# Patient Record
Sex: FEMALE | Race: ASIAN | Employment: FULL TIME | URBAN - METROPOLITAN AREA
[De-identification: names, ages, dates, MRNs, and addresses within clinical notes are randomized per-mention and may not be internally consistent; named-entity substitution may affect disease eponyms.]

---

## 2019-07-23 ENCOUNTER — PATIENT OUTREACH (OUTPATIENT)
Dept: OTHER | Age: 69
End: 2019-07-23

## 2019-07-23 NOTE — PROGRESS NOTES
CCM progress note    Patient eligible for [facility] Employee care management    PMH:   Past Medical History:   Diagnosis Date    Cancer [facility])     breast    H/O partial mastectomy 06/2019    Hypertension     Renal cell cancer, left (HCC) 07/22/2019       Current Outpatient Medications   Medication Sig    triamterene-hydroCHLOROthiazide (MAXZIDE) 37.5-25 mg per tablet TAKE ONE TAB BY MOUTH DAILY    potassium chloride SA (MICRO-K) 10 mEq capsule Take 10 mEq by mouth daily. No current facility-administered medications for this visit.         Social History:   Social History     Socioeconomic History    Marital status: UNKNOWN     Spouse name: Not on file    Number of children: Not on file    Years of education: Not on file    Highest education level: Not on file   Occupational History    Not on file   Social Needs    Financial resource strain: Not on file    Food insecurity:     Worry: Not on file     Inability: Not on file    Transportation needs:     Medical: Not on file     Non-medical: Not on file   Tobacco Use    Smoking status: Never Smoker    Smokeless tobacco: Never Used   Substance and Sexual Activity    Alcohol use: No     Frequency: Never    Drug use: No    Sexual activity: Not on file   Lifestyle    Physical activity:     Days per week: Not on file     Minutes per session: Not on file    Stress: Not on file   Relationships    Social connections:     Talks on phone: Not on file     Gets together: Not on file     Attends Buddhist service: Not on file     Active member of club or organization: Not on file     Attends meetings of clubs or organizations: Not on file     Relationship status: Not on file    Intimate partner violence:     Fear of current or ex partner: Not on file     Emotionally abused: Not on file     Physically abused: Not on file     Forced sexual activity: Not on file   Other Topics Concern     Service Not Asked    Blood Transfusions Not Asked    Caffeine Concern Not Asked    Occupational Exposure Not Asked   Mini Oleary Hazards Not Asked    Sleep Concern Not Asked    Stress Concern Not Asked    Weight Concern Not Asked    Special Diet Not Asked    Back Care Not Asked    Exercise Not Asked    Bike Helmet Not Asked   [address] Not Asked    Self-Exams Not Asked   Social History Narrative    Not on file       Two patient identifiers verified. Discussed the care management program.  Patient agrees to care management services at this time. Patient's primary care provider relationship reviewed with patient and modified, as applicable. Patient has significant diagnosis of Carcinoma of right breast and renal mass . Care management assessment completed:    Patient stated problem: \"Recovering from kidney removal. I have a lot going on. \"    Care manager identified primary concern Recent cancer diagnosis. Emotional Status/Adjustment to Health State: Patient is under stress and has had cancer diagnosis of right breast with lumpectomy and most recently a renal mass, having kidney removed. Patient remains positive and pleasant, however these recent changes in health has caused patient to be under a great deal of physical and emotional stress. Readiness to Change: []  Pre-contemplative    []  Contemplative  [x]  Preparation               []  Action                  []  Maintenance    Barriers/Challenges to Care: []  Decline in memory    []  Language barrier     [x]  Emotional                  []  Limited mobility  []  Lack of motivation     [] Vision, hearing or cognitive impairment []  Knowledge [] Financial Barriers  [x]  Other     Patient stated goal: Continue with treatments and work towards feeling better. Care Manager/Provider identified medical goal Patient will continue immunotherapy, followed by radiation therapy. Support System/Resources: Patient reports a great deal of support. Patient has her children, friends, and coworkers.  Siblings are all in the [facility], but has frequent contacts with them. Advance Care Planning: Not on file at this time. ADLS/DME: Patient is able to complete all ADL's independently. Referrals: None at this time      Upcoming appointments:   Future Appointments   Date Time Provider Demetris Khanna   8/5/2019  7:00 AM [facility]   8/8/2019  3:00 PM María Pulliam PA [facility] TERRY SCHED           Focused Assessment- Renal Condition Focused Assessment    Is patient on dialysis? no  Dialysis access site: NA   Activity level- moving several times a day, or as recommended? yes  Abnormal activity level reported: No   Any edema? no  Location of any swelling: NA  Nutrition- prescribed diet? no   Diet prescribed or recommended: None  Any of the following? Shortness of breath or difficulty breathing no  Dizziness/lightheadedness no   Fever no   Fatigue no     Anxiety yes    Confusion no   Unexplained change in weight loss no  New or worsening pain? no  If yes, pain rated 0-10: 2  New or worsening numbness or tingling? no  If yes, location of numbness and tingling: Patient does report neuropathy, numbness in fingers and toes. Difficulty sleeping? no       Key pt activities to achieve better health:   []  Weight loss  []  Improved diabetic control  []  Decreased cholesterol levels  []  Decreased blood pressure  []    []    Patient verbalized understanding of all information discussed. Pt has my contact information for any further questions, concerns, or needs. Plan for next call: Will discuss recent therapies and progress towards goal. Will reach out in 2-3 weeks.

## 2019-08-08 PROBLEM — C50.919 INFILTRATING DUCTAL CARCINOMA OF BREAST (HCC): Status: ACTIVE | Noted: 2018-12-03

## 2019-08-08 PROBLEM — C64.2 RENAL CANCER, LEFT (HCC): Status: ACTIVE | Noted: 2019-07-22

## 2019-08-08 PROBLEM — N28.89 RENAL MASS: Status: ACTIVE | Noted: 2019-06-14

## 2019-08-08 PROBLEM — I10 HYPERTENSIVE DISORDER: Status: ACTIVE | Noted: 2018-12-03

## 2019-08-12 ENCOUNTER — PATIENT OUTREACH (OUTPATIENT)
Dept: OTHER | Age: 69
End: 2019-08-12

## 2019-08-12 NOTE — PROGRESS NOTES
CCM Telephonic outreach to patient to follow up and assess progress towards towards goals, concerns, or questions. Patient answered the call and verified her  and zip code. Patient stated that she just reported to work, but would like this CM to reach out tomorrow on  at around 11:00. This CM will follow up and reach out on .

## 2019-08-13 ENCOUNTER — PATIENT OUTREACH (OUTPATIENT)
Dept: OTHER | Age: 69
End: 2019-08-13

## 2019-08-13 NOTE — PROGRESS NOTES
[facility] Telephonic outreach to patient as requested time, to review progress towards goals and questions or concerns. Left a discreet VM with a request for a return call. Will await a return call or will follow up with patient early next week.

## 2019-09-13 ENCOUNTER — PATIENT OUTREACH (OUTPATIENT)
Dept: OTHER | Age: 69
End: 2019-09-13

## 2019-09-13 NOTE — PROGRESS NOTES
[facility] Telephonic outreach attempt to follow up with patient. Left a discreet   Will continue attempts to outreach this patient.

## 2019-10-03 ENCOUNTER — PATIENT OUTREACH (OUTPATIENT)
Dept: OTHER | Age: 69
End: 2019-10-03

## 2019-10-03 NOTE — PROGRESS NOTES
[facility] Telephonic outreach attempt to follow up with patient. Left a discreet VM with a request for a return call.

## 2019-10-21 ENCOUNTER — PATIENT OUTREACH (OUTPATIENT)
Dept: OTHER | Age: 69
End: 2019-10-21

## 2019-10-21 NOTE — PROGRESS NOTES
Telephonic outreach attempt to follow up with patient. Left a discreet VM. Will await a return call or close episode later this week. Lost to follow up letter sent.

## 2019-10-21 NOTE — LETTER
10/21/2019 4:21 PM 
 
Ms. Feng Gonzales [address] [facility] [address] I have been trying to reach you for a follow up call for services with our Employee Care Management Program. Your wellbeing is very important to us. With continued partnership in the [facility] AND SURGERY [facility] program, we hope to work with you to optimize your health and increase your quality of life. I look forward to continuing to work with you. Please contact me at your convenience and we can schedule a time that works best for you. Sincerely, EVI Penny RN  Employee Care Manager [address] [place] [address], [address] [place] [address] [address] [facility] Cell [phone] Fax [email or web address]

## 2019-10-24 ENCOUNTER — PATIENT OUTREACH (OUTPATIENT)
Dept: OTHER | Age: 69
End: 2019-10-24

## 2019-10-24 NOTE — LETTER
10/24/2019 4:28 PM 
 
Ms. Amparo Hernandezula [address] [facility] [address] I have been trying to reach you for a follow up call for services with our Employee Care Management Program. Your wellbeing is very important to us. With continued partnership in the [facility] AND SURGERY [facility] program, we hope to work with you to optimize your health and increase your quality of life. I look forward to continuing to work with you. Please contact me at your convenience and we can schedule a time that works best for you. Sincerely, EVI Garcia RN  Employee Care Manager [address] [place] [address], [address] [place] [address] [address] [facility] Cell [phone] Fax [email or web address] [email or web address]

## 2020-08-26 ENCOUNTER — EMPLOYEE WELLNESS (OUTPATIENT)
Dept: OTHER | Facility: CLINIC | Age: 70
End: 2020-08-26

## 2020-08-26 LAB
CHOLEST SERPL-MCNC: 233 MG/DL
GLUCOSE SERPL-MCNC: 94 MG/DL (ref 74–99)
HDLC SERPL-MCNC: 84 MG/DL (ref 40–60)
LDLC SERPL CALC-MCNC: 124.4 MG/DL (ref 0–100)
TRIGL SERPL-MCNC: 123 MG/DL (ref ?–150)

## 2022-03-18 PROBLEM — I10 HYPERTENSIVE DISORDER: Status: ACTIVE | Noted: 2018-12-03

## 2022-03-19 PROBLEM — C64.2 RENAL CANCER, LEFT (HCC): Status: ACTIVE | Noted: 2019-07-22

## 2022-03-19 PROBLEM — C50.919 INFILTRATING DUCTAL CARCINOMA OF BREAST (HCC): Status: ACTIVE | Noted: 2018-12-03

## 2022-03-19 PROBLEM — N28.89 RENAL MASS: Status: ACTIVE | Noted: 2019-06-14

## 2023-01-31 RX ORDER — ANASTROZOLE 1 MG/1
1 TABLET ORAL DAILY
COMMUNITY

## 2023-01-31 RX ORDER — TRIAMTERENE AND HYDROCHLOROTHIAZIDE 37.5; 25 MG/1; MG/1
TABLET ORAL DAILY
COMMUNITY